# Patient Record
Sex: MALE | Race: WHITE
[De-identification: names, ages, dates, MRNs, and addresses within clinical notes are randomized per-mention and may not be internally consistent; named-entity substitution may affect disease eponyms.]

---

## 2020-03-11 ENCOUNTER — HOSPITAL ENCOUNTER (EMERGENCY)
Dept: HOSPITAL 62 - ER | Age: 65
LOS: 1 days | Discharge: HOME | End: 2020-03-12
Payer: MEDICARE

## 2020-03-11 DIAGNOSIS — R11.2: ICD-10-CM

## 2020-03-11 DIAGNOSIS — R42: ICD-10-CM

## 2020-03-11 DIAGNOSIS — H83.03: Primary | ICD-10-CM

## 2020-03-11 DIAGNOSIS — F17.200: ICD-10-CM

## 2020-03-11 LAB
ADD MANUAL DIFF: NO
ALBUMIN SERPL-MCNC: 4.1 G/DL (ref 3.5–5)
ALP SERPL-CCNC: 80 U/L (ref 38–126)
AMORPH SED URNS QL MICRO: (no result)
ANION GAP SERPL CALC-SCNC: 10 MMOL/L (ref 5–19)
APPEARANCE UR: (no result)
APTT PPP: YELLOW S
AST SERPL-CCNC: 42 U/L (ref 17–59)
BACTERIA #/AREA URNS HPF: (no result) /HPF
BASOPHILS # BLD AUTO: 0.1 10^3/UL (ref 0–0.2)
BASOPHILS NFR BLD AUTO: 0.8 % (ref 0–2)
BILIRUB DIRECT SERPL-MCNC: 0.3 MG/DL (ref 0–0.4)
BILIRUB SERPL-MCNC: 0.9 MG/DL (ref 0.2–1.3)
BILIRUB UR QL STRIP: NEGATIVE
BUN SERPL-MCNC: 18 MG/DL (ref 7–20)
CALCIUM: 8.9 MG/DL (ref 8.4–10.2)
CHLORIDE SERPL-SCNC: 107 MMOL/L (ref 98–107)
CO2 SERPL-SCNC: 23 MMOL/L (ref 22–30)
EOSINOPHIL # BLD AUTO: 0.1 10^3/UL (ref 0–0.6)
EOSINOPHIL NFR BLD AUTO: 0.6 % (ref 0–6)
ERYTHROCYTE [DISTWIDTH] IN BLOOD BY AUTOMATED COUNT: 13.7 % (ref 11.5–14)
GLUCOSE SERPL-MCNC: 130 MG/DL (ref 75–110)
GLUCOSE UR STRIP-MCNC: NEGATIVE MG/DL
HCT VFR BLD CALC: 48 % (ref 37.9–51)
HGB BLD-MCNC: 16.1 G/DL (ref 13.5–17)
INR PPP: 1.05
KETONES UR STRIP-MCNC: NEGATIVE MG/DL
LYMPHOCYTES # BLD AUTO: 1.5 10^3/UL (ref 0.5–4.7)
LYMPHOCYTES NFR BLD AUTO: 9.4 % (ref 13–45)
MCH RBC QN AUTO: 31.7 PG (ref 27–33.4)
MCHC RBC AUTO-ENTMCNC: 33.7 G/DL (ref 32–36)
MCV RBC AUTO: 94 FL (ref 80–97)
MONOCYTES # BLD AUTO: 0.6 10^3/UL (ref 0.1–1.4)
MONOCYTES NFR BLD AUTO: 3.6 % (ref 3–13)
NEUTROPHILS # BLD AUTO: 14.1 10^3/UL (ref 1.7–8.2)
NEUTS SEG NFR BLD AUTO: 85.6 % (ref 42–78)
PH UR STRIP: 5 [PH] (ref 5–9)
PLATELET # BLD: 167 10^3/UL (ref 150–450)
POTASSIUM SERPL-SCNC: 4.3 MMOL/L (ref 3.6–5)
PROT SERPL-MCNC: 8.1 G/DL (ref 6.3–8.2)
PROT UR STRIP-MCNC: 30 MG/DL
PROTHROMBIN TIME: 13.7 SEC (ref 11.4–15.4)
RBC # BLD AUTO: 5.09 10^6/UL (ref 4.35–5.55)
SP GR UR STRIP: 1.03
TOTAL CELLS COUNTED % (AUTO): 100 %
UROBILINOGEN UR-MCNC: 2 MG/DL (ref ?–2)
WBC # BLD AUTO: 16.5 10^3/UL (ref 4–10.5)

## 2020-03-11 PROCEDURE — 93005 ELECTROCARDIOGRAM TRACING: CPT

## 2020-03-11 PROCEDURE — 85025 COMPLETE CBC W/AUTO DIFF WBC: CPT

## 2020-03-11 PROCEDURE — 84484 ASSAY OF TROPONIN QUANT: CPT

## 2020-03-11 PROCEDURE — 71045 X-RAY EXAM CHEST 1 VIEW: CPT

## 2020-03-11 PROCEDURE — 96374 THER/PROPH/DIAG INJ IV PUSH: CPT

## 2020-03-11 PROCEDURE — 70450 CT HEAD/BRAIN W/O DYE: CPT

## 2020-03-11 PROCEDURE — 80053 COMPREHEN METABOLIC PANEL: CPT

## 2020-03-11 PROCEDURE — 96375 TX/PRO/DX INJ NEW DRUG ADDON: CPT

## 2020-03-11 PROCEDURE — 96361 HYDRATE IV INFUSION ADD-ON: CPT

## 2020-03-11 PROCEDURE — 81001 URINALYSIS AUTO W/SCOPE: CPT

## 2020-03-11 PROCEDURE — 36415 COLL VENOUS BLD VENIPUNCTURE: CPT

## 2020-03-11 PROCEDURE — 83690 ASSAY OF LIPASE: CPT

## 2020-03-11 PROCEDURE — 99284 EMERGENCY DEPT VISIT MOD MDM: CPT

## 2020-03-11 PROCEDURE — 93010 ELECTROCARDIOGRAM REPORT: CPT

## 2020-03-11 PROCEDURE — 82962 GLUCOSE BLOOD TEST: CPT

## 2020-03-11 PROCEDURE — 85610 PROTHROMBIN TIME: CPT

## 2020-03-11 NOTE — RADIOLOGY REPORT (SQ)
EXAM DESCRIPTION:



[facility]: CT HEAD WITHOUT IV CONTRAST 



CLINICAL HISTORY: 

65 years  Male;  dizziness, stroke alert;



TECHNIQUE: 

Noncontrast CT head.

All CT scans at this facility use dose modulation, iterative

reconstruction, and/or weight based dosing when appropriate to

reduce radiation dose to as low as reasonably achievable.



COMPARISON: None.



FINDINGS:

Gray matter, white matter, ventricles, and cisterns are within

normal limits. No acute hemorrhage or mass effect.

Visualized portions of paranasal sinuses and mastoids are clear. 

Visualized portions of the calvarium are within normal limits.



IMPRESSION:



1.  No acute intracranial findings.

## 2020-03-11 NOTE — RADIOLOGY REPORT (SQ)
EXAM DESCRIPTION: 



XR CHEST 1 VIEW



COMPLETED DATE/TME:  03/11/2020 22:26



CLINICAL HISTORY: 



65 years, Male, dizziness



COMPARISON:

None.



NUMBER OF VIEWS:

Single



TECHNIQUE:





LIMITATIONS:

None.



FINDINGS:



Cardiomediastinal silhouette is top normal. Mild LVH

configuration. Mild interstitial prominence, presumed chronic. No

consolidation effusion or pneumothorax. Mild laxity in the

shoulders



IMPRESSION:



Active intrathoracic disease.

 



copyright 2011 [employer]- All Rights Reserved

## 2020-03-11 NOTE — ER DOCUMENT REPORT
ED Dizziness/Weakness





- General


Chief Complaint: Dizziness


Stated Complaint: NAUSEA/VOMITING/DIZZY


Time Seen by Provider: 20 22:16


Primary Care Provider: 


MICHELLE PADGETT [NO LOCAL MD] - Follow up as needed


Mode of Arrival: Ambulatory


Information source: Patient


Notes: 





65-year-old man presents with a two-week history of dizziness, nausea and 

imbalance.  He was being treated for vertigo apparently given a course of 

Zithromax followed by treatment with amoxicillin.  He is presently not taking 

any other medications.  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 

increased symptoms with some associated dizziness.  He went home and developed 

nausea and vomiting episodes with continued imbalance.  His wife convinced him 

to come to the emergency department for further evaluation and treatment.


TRAVEL OUTSIDE OF THE [place] IN LAST 30 DAYS: No





- Related Data


Allergies/Adverse Reactions: 


                                        





No Known Allergies Allergy (Unverified 20 22:17)


   











Past Medical History





- Social History


Smoking Status: Current Every Day Smoker


Chew tobacco use (# tins/day): No


Frequency of alcohol use: None


Drug Abuse: None


Family History: Reviewed & Not Pertinent


Patient has suicidal ideation: No


Patient has homicidal ideation: No





Review of Systems





- Review of Systems


Notes: 





Constitutional: Negative for fever.


HENT: + Ear fullness


Eyes: Negative for visual changes.


Cardiovascular: Negative for chest pain.


Respiratory: Negative for shortness of breath.


Gastrointestinal: Negative for abdominal pain, vomiting or diarrhea.


Genitourinary: Negative for dysuria.


Musculoskeletal: Negative for back pain.


Skin: Negative for rash.


Neurological: + Imbalance and dizziness, negative for headaches, weakness or 

numbness.





10 point ROS negative except as marked above and in HPI.





Physical Exam





- Vital signs


Vitals: 


                                        











Temp Pulse Resp BP Pulse Ox


 


 97.7 F   66   20   124/71   96 


 


 20 21:28  20 21:28  20 21:28  20 21:28  20 21:28














- Notes


Notes: 





PHYSICAL EXAMINATION:


 


Physical Exam:


General: Well-nourished well-developed  in no acute distress


HEENT: NC/AT, pupils equal round and reactive to light, bilateral tympanic 

membranes with air-fluid levels, dullness of the TM, MM moist,nares clear, 

oropharynx clear, airway patent, + lateral nystagmus


Neck: supple, no adenopathy, no masses.  Good range of motion


Lungs: clear, no wheezing, no rales no rhonchi


CVS: Regular rate and rhythm no murmur gallop or rub


Abdomen: Soft, active, nontender, no masses, no hepatosplenomegaly


Ext:   No edema, clubbing or cyanosis.


Neuro: Alert and responsive, moving all 4 extremities on command, cranial nerves

intact, no focal findings


Skin: Intact no open lesions, no rash


PSYCH: Normal mood, normal affect.


 








Course





- Re-evaluation


Re-evalutation: 





20 01:10


Patient was evaluated in front end triage, CT of the head, laboratory data and 

chest x-ray are nondiagnostic..  He was given IV fluids, Decadron 10 mg IV and 

Zofran with a oral dose of meclizine.  The patient states that he is feeling 

much better his symptoms have resolved.  He will be discharged home with 

meclizine and prednisone.  I have asked him to continue the amoxicillin that was

prescribed by his primary care doctor.











- Vital Signs


Vital signs: 


                                        











Temp Pulse Resp BP Pulse Ox


 


 97.7 F   70   12   115/74   93 


 


 20 21:28  20 22:22  20 01:01  20 01:01  20 01:01














- Laboratory


Result Diagrams: 


                                 20 23:00





                                 20 23:00


Laboratory results interpreted by me: 


                                        











  20





  22:59 23:00 23:00


 


WBC   16.5 H 


 


Lymph % (Auto)   9.4 L 


 


Absolute Neuts (auto)   14.1 H 


 


Seg Neutrophils %   85.6 H 


 


Glucose    130 H


 


POC Glucose  123 H  


 


Urine Protein   


 


Urine Urobilinogen   














  20





  23:00


 


WBC 


 


Lymph % (Auto) 


 


Absolute Neuts (auto) 


 


Seg Neutrophils % 


 


Glucose 


 


POC Glucose 


 


Urine Protein  30 H


 


Urine Urobilinogen  2.0 H











20 01:18


I have reviewed laboratory data and used this information for the treatment 

decisions regarding the patient.





- Diagnostic Test


Radiology reviewed: Image reviewed, Reports reviewed - Chest x-ray: Mild 

cardiomegaly otherwise normal  CT head noncontrast: No acute intracranial 

pathology noted on CT.





Discharge





- Discharge


Clinical Impression: 


 Labyrinthitis of both ears, Vertigo





Acute serous otitis media of both ears


Qualifiers:


 Recurrence: non-recurrent Qualified Code(s): H65.03 - Acute serous otitis 

media, bilateral





Nausea and vomiting


Qualifiers:


 Vomiting type: unspecified Vomiting Intractability: non-intractable Qualified 

Code(s): R11.2 - Nausea with vomiting, unspecified





Condition: Good


Disposition: HOME, SELF-CARE


Instructions:  Antinausea Medication ([facility]), Labyrinthitis ([facility]), Meclizine ([facility])


Additional Instructions: 


You are diagnosed with labyrinthitis and secondary vertigo, likely related to 

the fluid behind the eardrums and congestion.  Please continue the amoxicillin 

which was prescribed by your primary care physician.  Began meclizine as 

prescribed, prednisone as prescribed, use Zofran for nausea as prescribed, 

follow-up with your primary care doctor as needed.  If your symptoms worsen or 

you have other concerns you may return to the emergency department for further 

treatment.





HOME CARE INSTRUCTIONS & INFORMATION:  Thank you for choosing us for your 

medical needs. We hope you're satisfied with the care you received.  After you 

leave, you must properly care for your problem and, at the same time, observe 

its progress.  Any condition can change.  Some illnesses can change rapidly over

hours or days.  If your condition worsens, return to the Emergency Department or

see your physician promptly.





ABOUT YOUR X-RAYS AND EKG'S:   If you had an EKG or X-rays taken, they have been

read by the Emergency Physician. The X-rays and EKG's will also be read by a 

Radiologist or Cardiologist within 24 hours.  If discrepancies are noted, you 

will be notified by telephone.  Please be certain the ED has a correct telephone

number & address where you can be reached.  Also, realize that some fractures or

abnormalities do not show up on initial X-rays.  If your symptoms continue, see 

your physician.





ABOUT YOUR LABORATORY TEST:   If you had laboratory tests, the results have been

reviewed by the Emergency Physician.  Some test results (for example cultures) 

may not be available for several days.  You will be contacted if any test result

shows you need additional treatment.  Please be certain the ED has a correct 

telephone number and address where you can be reached.





ABOUT YOUR MEDICATIONS:  You will receive instructions on how to take your 

medicine on the prescription label you receive.  Additional information may be 

provided by the Pharmacy.  If you have questions afterwards, call the ED for 

clarification or further instructions.  Some prescribed medications may cause 

drowsiness.  Do not perform tasks such as driving a car or operating machinery 

without consulting your Pharmacist.  If you feel you need a refill of pain 

medication, your condition will need re-evaluation.  Please do not call for a 

refill of any medication.





ABOUT YOUR SIGNATURE:   Signature of this document acknowledges to followin. Understanding that you received emergency treatment and that you may be r

eleased before al medical problems are known or treated. Please be certain   the

ED has a correct phone number & address where you can be reached.


   2. Acknowledgement that you will arrange for follow-up care as recommended.


   3. Authorization for the Emergency Physician to provide information to your 

follow-up Physician in order to maximize your care.





AT ANY TIME, IF YOUR SYMPTOMS CHANGE SIGNIFICANTLY OR WORSEN OR YOU DEVELOP NEW 

SYMPTOMS, RETURN TO THE EMERGENCY DEPARTMENT IMMEDIATELY FOR RE-EVALUATION.





OUR GOAL IS TO PROVIDE EXCELLENT MEDICAL CARE!





WE HOPE THAT WE HAVE MET YOUR EXPECTATIONS DURING YOUR EMERGENCY DEPARTMENT 

VISIT AND THAT YOU FEEL YOU HAVE RECEIVED EXCELLENT CARE!











Prescriptions: 


Meclizine HCl [Antivert 25 mg Tablet] 25 mg PO TID PRN #30 tablet


 PRN Reason: 


Prednisone [Deltasone 20 mg Tablet] 1 tab PO BID 5 Days #10 tablet


Ondansetron [Zofran Odt 4 mg Tablet] 1 - 2 tab PO Q4H PRN #15 tab.rapdis


 PRN Reason: For Nausea/Vomiting


Referrals: 


LOCALMD,NO [NO LOCAL MD] - Follow up as needed

## 2020-03-11 NOTE — ER DOCUMENT REPORT
ED Medical Screen (RME)





- General


Chief Complaint: Dizziness


Stated Complaint: NAUSEA/VOMITING/DIZZY


Time Seen by Provider: 03/11/20 22:16


Primary Care Provider: 


MICHELLE PADGETT [Primary Care Provider] - Follow up as needed


TRAVEL OUTSIDE OF THE [place] IN LAST 30 DAYS: No





- HPI


Notes: 





03/11/20 22:28


Mr. Prado is a 65-year-old  male with past medical history of tobacco 

abuse who comes in today with chief complaint sinus symptoms for the past 1 to 2

days. He developed vertigo/dizziness that began rather suddently at 830pm 

tonight.  He states that the symptoms today became became progressively worse 

with additional frontal headache, nausea, vomiting, right lower quadrant 

abdominal pain, and generalized weakness. He states that he is normally able to 

ambulate without concern, but has been unable to do so due to his symptoms.  Of 

note patient has had several days worth of congestion, headache, and sinus 

pressure, and was prescribed amoxicillin on 3/8 for sinus infection.  Currently 

he denies any fevers, chest pain, or shortness of breath.





Charge nurse/Dr. Rosado notified.


Pt to go to room #17 after CT.








I have treated and performed a rapid initial assessment of this patient.  A 

comprehensive ED assessment and evaluation of the patient, analysis of test 

results and completion of medical decision making process will be conducted by 

additional ED providers.





PHYSICAL EXAMINATION:





GENERAL: Well-appearing, well-nourished and in no acute distress.  A&Ox4.  

Answers questions appropriately.


Head: Atraumatic


Eyes: No raccoon eyes, PERRLA, EOMI bilaterally.  + horizontal nystagmus to left

gaze


Neuro: NIH 0 (score of 1 with ataxic gait, no individual limb ataxia currently),

GCS 15, cranial nerves grossly intact.








- Related Data


Allergies/Adverse Reactions: 


                                        





No Known Allergies Allergy (Unverified 03/11/20 22:17)


   











Past Medical History





- Social History


Chew tobacco use (# tins/day): No


Frequency of alcohol use: None


Drug Abuse: None





Physical Exam





- Vital signs


Vitals: 


                                        











Temp Pulse Resp BP Pulse Ox


 


 97.7 F   66   20   124/71   96 


 


 03/11/20 21:28  03/11/20 21:28  03/11/20 21:28  03/11/20 21:28  03/11/20 21:28














Course





- Vital Signs


Vital signs: 


                                        











Temp Pulse Resp BP Pulse Ox


 


 97.7 F   66   20   124/71   96 


 


 03/11/20 21:28  03/11/20 21:28  03/11/20 21:28  03/11/20 21:28  03/11/20 21:28














Doctor's Discharge





- Discharge


Referrals: 


LOCALMD,NO [Primary Care Provider] - Follow up as needed

## 2020-03-12 VITALS — DIASTOLIC BLOOD PRESSURE: 74 MMHG | SYSTOLIC BLOOD PRESSURE: 115 MMHG

## 2020-03-12 NOTE — EKG REPORT
SEVERITY:- ABNORMAL ECG -

SINUS RHYTHM

FIRST DEGREE AV BLOCK

BORDERLINE T WAVE ABNORMALITIES

:

Confirmed by: Wendi Gomez 12-Mar-2020 09:58:26

## 2020-03-27 ENCOUNTER — HOSPITAL ENCOUNTER (EMERGENCY)
Dept: HOSPITAL 62 - ER | Age: 65
Discharge: HOME | End: 2020-03-27
Payer: MEDICARE

## 2020-03-27 VITALS — DIASTOLIC BLOOD PRESSURE: 80 MMHG | SYSTOLIC BLOOD PRESSURE: 128 MMHG

## 2020-03-27 DIAGNOSIS — H65.22: ICD-10-CM

## 2020-03-27 DIAGNOSIS — R42: Primary | ICD-10-CM

## 2020-03-27 DIAGNOSIS — Z79.899: ICD-10-CM

## 2020-03-27 DIAGNOSIS — F17.210: ICD-10-CM

## 2020-03-27 DIAGNOSIS — H55.00: ICD-10-CM

## 2020-03-27 LAB
ADD MANUAL DIFF: NO
ALBUMIN SERPL-MCNC: 4 G/DL (ref 3.5–5)
ALP SERPL-CCNC: 69 U/L (ref 38–126)
ANION GAP SERPL CALC-SCNC: 8 MMOL/L (ref 5–19)
AST SERPL-CCNC: 42 U/L (ref 17–59)
BASOPHILS # BLD AUTO: 0.1 10^3/UL (ref 0–0.2)
BASOPHILS NFR BLD AUTO: 0.7 % (ref 0–2)
BILIRUB DIRECT SERPL-MCNC: 0.1 MG/DL (ref 0–0.4)
BILIRUB SERPL-MCNC: 1.1 MG/DL (ref 0.2–1.3)
BUN SERPL-MCNC: 17 MG/DL (ref 7–20)
CALCIUM: 8.9 MG/DL (ref 8.4–10.2)
CHLORIDE SERPL-SCNC: 107 MMOL/L (ref 98–107)
CO2 SERPL-SCNC: 23 MMOL/L (ref 22–30)
EOSINOPHIL # BLD AUTO: 0.1 10^3/UL (ref 0–0.6)
EOSINOPHIL NFR BLD AUTO: 0.5 % (ref 0–6)
ERYTHROCYTE [DISTWIDTH] IN BLOOD BY AUTOMATED COUNT: 13.9 % (ref 11.5–14)
GLUCOSE SERPL-MCNC: 118 MG/DL (ref 75–110)
HCT VFR BLD CALC: 47.8 % (ref 37.9–51)
HGB BLD-MCNC: 16.4 G/DL (ref 13.5–17)
LYMPHOCYTES # BLD AUTO: 1.3 10^3/UL (ref 0.5–4.7)
LYMPHOCYTES NFR BLD AUTO: 13 % (ref 13–45)
MCH RBC QN AUTO: 31.8 PG (ref 27–33.4)
MCHC RBC AUTO-ENTMCNC: 34.3 G/DL (ref 32–36)
MCV RBC AUTO: 93 FL (ref 80–97)
MONOCYTES # BLD AUTO: 0.5 10^3/UL (ref 0.1–1.4)
MONOCYTES NFR BLD AUTO: 4.6 % (ref 3–13)
NEUTROPHILS # BLD AUTO: 8.3 10^3/UL (ref 1.7–8.2)
NEUTS SEG NFR BLD AUTO: 81.2 % (ref 42–78)
PLATELET # BLD: 153 10^3/UL (ref 150–450)
POTASSIUM SERPL-SCNC: 4.3 MMOL/L (ref 3.6–5)
PROT SERPL-MCNC: 7.5 G/DL (ref 6.3–8.2)
RBC # BLD AUTO: 5.16 10^6/UL (ref 4.35–5.55)
TOTAL CELLS COUNTED % (AUTO): 100 %
WBC # BLD AUTO: 10.2 10^3/UL (ref 4–10.5)

## 2020-03-27 PROCEDURE — 93005 ELECTROCARDIOGRAM TRACING: CPT

## 2020-03-27 PROCEDURE — 93010 ELECTROCARDIOGRAM REPORT: CPT

## 2020-03-27 PROCEDURE — 99284 EMERGENCY DEPT VISIT MOD MDM: CPT

## 2020-03-27 PROCEDURE — 36415 COLL VENOUS BLD VENIPUNCTURE: CPT

## 2020-03-27 PROCEDURE — 96374 THER/PROPH/DIAG INJ IV PUSH: CPT

## 2020-03-27 PROCEDURE — 85025 COMPLETE CBC W/AUTO DIFF WBC: CPT

## 2020-03-27 PROCEDURE — 80053 COMPREHEN METABOLIC PANEL: CPT

## 2020-03-27 NOTE — ER DOCUMENT REPORT
Entered by JUAN PATTERSON SCRIBE  03/27/20 [id number] 





Acting as scribe for:KATIE DWYER MD





ED General





- General


Chief Complaint: Dizziness


Stated Complaint: DIZZINESS


Time Seen by Provider: 03/27/20 12:41


Primary Care Provider: 


[facility] [Provider Group] - Follow up in 3-5 days


Information source: Patient


Notes: 





This 65 year old male patient presents to the emergency department today with 

complaints of dizziness since 10 am this morning. Patient states he felt "off c

enter" this morning at work and decided to come to the hospital. Patient states 

he visited the emergency department x2 weeks ago for dizziness and nausea. 

Patient states he visited urgent care x4 days after his last visit to the ED, 

and was told there was fluid in his ears. Patient states he feels like there is 

still fluid in his left ear. 


TRAVEL OUTSIDE OF THE [place] IN LAST 30 DAYS: No





- Related Data


Allergies/Adverse Reactions: 


                                        





No Known Allergies Allergy (Unverified 03/11/20 22:17)


   








Home Medications: Flonase





Past Medical History





- General


Information source: Patient





- Social History


Smoking Status: Current Every Day Smoker


Cigarette use (# per day): Yes


Frequency of alcohol use: None


Drug Abuse: None


Family History: Reviewed & Not Pertinent


Patient has suicidal ideation: No


Patient has homicidal ideation: No





Review of Systems





- Review of Systems


Constitutional: No symptoms reported


EENT: See HPI, Ear pain


Cardiovascular: See HPI, Dizziness


Respiratory: No symptoms reported


Gastrointestinal: See HPI


Genitourinary: No symptoms reported


Male Genitourinary: No symptoms reported


Musculoskeletal: No symptoms reported


Skin: No symptoms reported


Hematologic/Lymphatic: No symptoms reported


Neurological/Psychological: No symptoms reported





Physical Exam





- Vital signs


Vitals: 


                                        











Temp Pulse Resp BP Pulse Ox


 


 98.1 F   60   18   138/77 H  95 


 


 03/27/20 12:37  03/27/20 12:37  03/27/20 12:37  03/27/20 12:37  03/27/20 12:37














- General


General appearance: Appears well, Alert





- HEENT


Head: Normocephalic, Atraumatic


Eyes: Other - Lateral gaze nystagmus.


Ears: Normal


External canal: Normal


Tympanic membrane: Injected - Right, Retracted - Right, Serous effusion - Left


Neck: Normal





- Respiratory


Respiratory status: No respiratory distress


Chest status: Nontender


Breath sounds: Normal


Chest palpation: Normal





- Cardiovascular


Rhythm: Regular


Heart sounds: Normal auscultation


Murmur: No





- Abdominal


Inspection: Normal


Distension: No distension


Bowel sounds: Normal


Tenderness: Nontender





- Extremities


General upper extremity: Normal inspection.  No: Edema


General lower extremity: Normal inspection.  No: Edema





- Neurological


Neuro grossly intact: Yes


Cognition: Normal


Orientation: AAOx4





- Psychological


Associated symptoms: Normal affect, Normal mood





- Skin


Skin Temperature: Warm


Skin Moisture: Dry


Skin Color: Normal





Course





- Re-evaluation


Re-evalutation: 





03/27/20 14:58


The patient got up to go to the restroom, when he came back he stated that his 

dizzy sensation and off-balance feeling is considerably better than it was 

previously.  He will be referred to [facility] for further evaluation of his 

recurrent vertigo and his chronic left serous otitis media.





- Vital Signs


Vital signs: 


                                        











Temp Pulse Resp BP Pulse Ox


 


 98.1 F   78   17   132/78 H  98 


 


 03/27/20 12:37  03/27/20 13:32  03/27/20 13:32  03/27/20 13:32  03/27/20 13:32














- Laboratory


Result Diagrams: 


                                 03/27/20 13:09





                                 03/27/20 13:09


Laboratory results interpreted by me: 


                                        











  03/27/20 03/27/20





  13:09 13:09


 


Absolute Neuts (auto)  8.3 H 


 


Seg Neutrophils %  81.2 H 


 


Glucose   118 H














- EKG Interpretation by Me


EKG shows normal: Sinus rhythm, Axis, Intervals, QRS Complexes, ST-T Waves


Rate: Normal - 59


Rhythm: NSR





Discharge





- Discharge


Clinical Impression: 


 Vertigo





Serous otitis media


Qualifiers:


 Chronicity: unspecified Laterality: left Qualified Code(s): H65.92 - 

Unspecified nonsuppurative otitis media, left ear





Condition: Stable


Disposition: HOME, SELF-CARE


Additional Instructions: 


Serous Otitis Media:





     You have serous otitis --  a vacuum or a fluid build-up in your middle ear 

cavity.  This is caused by blockage of the eustachian tube, which connects the 

middle ear cavity to the back of the throat.


     Serous otitis is usually treated with decongestants.  Antibiotics are given

if there is a question of either early or chronic ear infection.  Maneuvers to 

open the eustachian tube may help.  For example, holding your nose then gently 

"breathing" air up against the closed nostrils may "pop" the eardrums (meaning 

the eustachian tube has opened).


     Unfortunately, this condition sometimes takes days or weeks to resolve.  In

young children, tubes may be required if serous otitis continues despite 

treatment.  A second exam is usually scheduled to see if the problem has 

improved.


     If you develop severe ear pain, drainage from the ear, headache, or fever, 

call the doctor or return for re-examination.





Vertigo:





     You have experienced an episode of vertigo -- a whirling dizziness which 

may be accompanied by nausea and vomiting or staggering.  Vertigo is often 

caused by an irritation of the inner ear, in which case it is called 

labyrinthitis.  It can also be a symptom of a degenerating inner ear, nerve 

damage, or brain injury.  Your physician has evaluated you to determine whether 

any further testing is necessary.


     Vertigo is often treated with dramamine or meclizine.  These medications 

are helpful, but stronger medication may be needed if you are vomiting.  Rest in

bed.  You should not drive or operate machinery until completely better.  It may

take one to three weeks for recovery.


     If there are new symptoms, such as decreased hearing or vision, severe 

headache, weakness or faintness, or confusion, call the physician.








*****************************************************************************

********************************************





Take the medication as prescribed for the dizziness.


Do not drive, operate machinery, or climb ladders or any other activity that 

puts you at risk of injury if the dizziness were to come on suddenly.


Call [facility] to schedule an appointment in the next few days 

for further evaluation of your recurring vertigo and chronic serous otitis 

media.





RETURN TO THE EMERGENCY ROOM IF ANY NEW OR WORSENING SYMPTOMS.








Prescriptions: 


Meclizine HCl [Antivert 25 mg Tablet] 25 mg PO TID PRN #30 tablet


 PRN Reason: 


Forms:  Return to Work


Referrals: 


[facility] [Provider Group] - Follow up in 3-5 days





I personally performed the services described in the documentation, reviewed and

edited the documentation which was dictated to the scribe in my presence, and it

accurately records my words and actions.